# Patient Record
Sex: FEMALE | Race: WHITE | NOT HISPANIC OR LATINO | Employment: UNEMPLOYED | URBAN - METROPOLITAN AREA
[De-identification: names, ages, dates, MRNs, and addresses within clinical notes are randomized per-mention and may not be internally consistent; named-entity substitution may affect disease eponyms.]

---

## 2020-09-03 ENCOUNTER — APPOINTMENT (OUTPATIENT)
Dept: WOMENS IMAGING | Facility: HOSPITAL | Age: 20
End: 2020-09-03

## 2020-09-03 PROCEDURE — 76641 ULTRASOUND BREAST COMPLETE: CPT | Performed by: RADIOLOGY

## 2020-09-18 ENCOUNTER — OFFICE VISIT (OUTPATIENT)
Dept: OBSTETRICS AND GYNECOLOGY | Facility: CLINIC | Age: 20
End: 2020-09-18

## 2020-09-18 VITALS
WEIGHT: 204 LBS | BODY MASS INDEX: 30.92 KG/M2 | SYSTOLIC BLOOD PRESSURE: 120 MMHG | HEIGHT: 68 IN | DIASTOLIC BLOOD PRESSURE: 70 MMHG

## 2020-09-18 DIAGNOSIS — Z01.419 ENCOUNTER FOR ANNUAL ROUTINE GYNECOLOGICAL EXAMINATION: Primary | ICD-10-CM

## 2020-09-18 DIAGNOSIS — Z11.3 SCREENING FOR STD (SEXUALLY TRANSMITTED DISEASE): ICD-10-CM

## 2020-09-18 DIAGNOSIS — Z30.9 ENCOUNTER FOR CONTRACEPTIVE MANAGEMENT, UNSPECIFIED TYPE: ICD-10-CM

## 2020-09-18 PROCEDURE — 99385 PREV VISIT NEW AGE 18-39: CPT | Performed by: NURSE PRACTITIONER

## 2020-09-18 NOTE — PROGRESS NOTES
GYN Annual Exam     Chief Complaint   Patient presents with   • Gynecologic Exam     Annual exam.        HPI    Jose Khan is a 20 y.o. female who presents for annual well woman exam.  She is sexually active. Currently not using anything for contraception. Periods are regular every 28-30 days, lasting 5 days. Dysmenorrhea:mild, occurring first 1-2 days of flow. Cyclic symptoms include none. No intermenstrual bleeding, spotting, or discharge. Performing SBE:  No. Recent mammogram in preparation for breast reduction  This is my first time meeting Jose Khan      OB History    No obstetric history on file.         LMP-2020  Last Pap-has never had  History of abnormal Pap smear: no  History of STD-chlamydia   Family history of uterine, colon or ovarian cancer: no  Family history of breast cancer: no  Mammogram-  History of abnormal mammogram: no  Gardasil Vaccine: unsure    Past Medical History:   Diagnosis Date   • Anemia in pregnancy 2016   •  (spontaneous vaginal delivery) 2016   •  (spontaneous vaginal delivery)        Past Surgical History:   Procedure Laterality Date   • NO PAST SURGERIES         No current outpatient medications on file.    No Known Allergies    Social History     Tobacco Use   • Smoking status: Current Every Day Smoker   Substance Use Topics   • Alcohol use: Not on file   • Drug use: Not on file       Family History   Problem Relation Age of Onset   • No Known Problems Father    • No Known Problems Mother        Review of Systems   Constitutional: Negative for chills, fatigue and fever.   Gastrointestinal: Negative for abdominal distention, abdominal pain, diarrhea, nausea and vomiting.   Genitourinary: Negative for amenorrhea, breast discharge, breast lump, breast pain, dysuria, menstrual problem, pelvic pain, pelvic pressure, vaginal bleeding, vaginal discharge and vaginal pain.   Musculoskeletal: Negative for back pain and gait problem.   Skin: Negative for color  "change and rash.   Neurological: Negative for dizziness and headache.   Psychiatric/Behavioral: Negative for behavioral problems and depressed mood.       /70   Ht 172.7 cm (68\")   Wt 92.5 kg (204 lb)   LMP 09/08/2020   BMI 31.02 kg/m²     Physical Exam  Vitals signs and nursing note reviewed.   Constitutional:       Appearance: Normal appearance.   HENT:      Head: Normocephalic and atraumatic.   Eyes:      Pupils: Pupils are equal, round, and reactive to light.   Neck:      Musculoskeletal: Normal range of motion.   Cardiovascular:      Rate and Rhythm: Normal rate.   Pulmonary:      Effort: Pulmonary effort is normal. No respiratory distress.   Chest:      Breasts: Breasts are symmetrical.         Right: Normal. No swelling, bleeding, inverted nipple, mass, nipple discharge, skin change or tenderness.         Left: Normal. No swelling, bleeding, inverted nipple, mass, nipple discharge, skin change or tenderness.   Abdominal:      General: There is no distension.      Palpations: Abdomen is soft. There is no mass.      Tenderness: There is no abdominal tenderness. There is no guarding.      Hernia: No hernia is present. There is no hernia in the left inguinal area or right inguinal area.   Genitourinary:     Pubic Area: No rash or pubic lice.       Labia:         Right: No rash, tenderness, lesion or injury.         Left: No rash, tenderness, lesion or injury.       Urethra: No prolapse, urethral pain, urethral swelling or urethral lesion.      Vagina: No signs of injury and foreign body. No vaginal discharge, erythema, tenderness, bleeding, lesions or prolapsed vaginal walls.      Cervix: No cervical motion tenderness, discharge, friability, lesion, erythema, cervical bleeding or eversion.      Uterus: Normal. Not deviated, not enlarged and not tender.       Adnexa: Right adnexa normal and left adnexa normal.        Right: No mass, tenderness or fullness.          Left: No mass, tenderness or fullness.  "    Musculoskeletal: Normal range of motion.         General: No swelling.   Lymphadenopathy:      Upper Body:      Right upper body: No supraclavicular, axillary or pectoral adenopathy.      Left upper body: No supraclavicular, axillary or pectoral adenopathy.      Lower Body: No right inguinal adenopathy. No left inguinal adenopathy.   Skin:     General: Skin is warm and dry.      Coloration: Skin is not jaundiced or pale.      Findings: No bruising, erythema, lesion or rash.   Neurological:      General: No focal deficit present.      Mental Status: She is alert and oriented to person, place, and time.      Cranial Nerves: No cranial nerve deficit.   Psychiatric:         Mood and Affect: Mood normal.         Behavior: Behavior normal.         Thought Content: Thought content normal.         Judgment: Judgment normal.            Assessment     1. Annual Gyn Exam  2. Contraception management  3. Screening for STD    Plan   1. Well woman exam: Pap collected Yes. Recommend MVI daily.    2. Contraception: pt declines  3. STD: Enc condoms. Desires STD screen today- Yes. NuSwab declines serum  Smoking status: Jose Khan  reports that she has been smoking. She does not have any smokeless tobacco history on file.. I have educated her on the risk of diseases from using tobacco products such as cancer, COPD and heart diease. I advised her to quit and she is not willing to quit.I spent 3  minutes counseling the patient.   5.  Patient's Body mass index is 31.02 kg/m². BMI is above normal parameters. Recommendations include: exercise counseling and nutrition counseling.  6.   Encouraged annual mammogram screening starting at age 40. Instructed on how to perform SBE. Encouraged breast health self awareness.  7.    Encouraged 150 minutes of exercise per week if not medially contraindicated.     Follow Up one year or PRN    Gertrudis Devlin, APRN  9/18/2020  12:46 EDT

## 2020-09-23 LAB
A VAGINAE DNA VAG QL NAA+PROBE: ABNORMAL SCORE
BVAB2 DNA VAG QL NAA+PROBE: ABNORMAL SCORE
C ALBICANS DNA VAG QL NAA+PROBE: NEGATIVE
C GLABRATA DNA VAG QL NAA+PROBE: NEGATIVE
C TRACH DNA VAG QL NAA+PROBE: POSITIVE
CONV .: NORMAL
CYTOLOGIST CVX/VAG CYTO: NORMAL
CYTOLOGY CVX/VAG DOC CYTO: NORMAL
CYTOLOGY CVX/VAG DOC THIN PREP: NORMAL
DX ICD CODE: NORMAL
HIV 1 & 2 AB SER-IMP: NORMAL
MEGA1 DNA VAG QL NAA+PROBE: ABNORMAL SCORE
N GONORRHOEA DNA VAG QL NAA+PROBE: NEGATIVE
OTHER STN SPEC: NORMAL
STAT OF ADQ CVX/VAG CYTO-IMP: NORMAL
T VAGINALIS DNA VAG QL NAA+PROBE: NEGATIVE

## 2020-09-24 ENCOUNTER — TELEPHONE (OUTPATIENT)
Dept: OBSTETRICS AND GYNECOLOGY | Facility: CLINIC | Age: 20
End: 2020-09-24

## 2020-09-24 RX ORDER — AZITHROMYCIN 500 MG/1
1000 TABLET, FILM COATED ORAL DAILY
Qty: 2 TABLET | Refills: 0 | Status: SHIPPED | OUTPATIENT
Start: 2020-09-24 | End: 2020-09-25

## 2020-09-24 RX ORDER — METRONIDAZOLE 500 MG/1
500 TABLET ORAL 2 TIMES DAILY
Qty: 14 TABLET | Refills: 0 | Status: SHIPPED | OUTPATIENT
Start: 2020-09-24 | End: 2020-10-01

## 2020-09-24 NOTE — TELEPHONE ENCOUNTER
I called Jose and notified vaginal cultures were positive for BV and chlamydia. Advised partner should be tested and treated for chlamydia. Avoid intercourse until 7 days after both have been treated. Recommend f/u in 6-8 weeks for SHIRAZ.     Sending azithromycin and flagyl to pharmacy. Advised not to drink alcohol while taking flagyl.    Gertrudis Devlin, APRN  9/24/2020  12:40

## 2022-03-02 ENCOUNTER — OFFICE VISIT (OUTPATIENT)
Dept: OBSTETRICS AND GYNECOLOGY | Facility: CLINIC | Age: 22
End: 2022-03-02

## 2022-03-02 VITALS
SYSTOLIC BLOOD PRESSURE: 125 MMHG | WEIGHT: 214 LBS | BODY MASS INDEX: 32.43 KG/M2 | DIASTOLIC BLOOD PRESSURE: 71 MMHG | HEIGHT: 68 IN

## 2022-03-02 DIAGNOSIS — Z30.011 ENCOUNTER FOR INITIAL PRESCRIPTION OF CONTRACEPTIVE PILLS: ICD-10-CM

## 2022-03-02 DIAGNOSIS — R10.2 PELVIC PAIN: ICD-10-CM

## 2022-03-02 DIAGNOSIS — N89.8 VAGINAL DISCHARGE: ICD-10-CM

## 2022-03-02 DIAGNOSIS — N89.8 VAGINAL ITCHING: ICD-10-CM

## 2022-03-02 DIAGNOSIS — Z01.419 WOMEN'S ANNUAL ROUTINE GYNECOLOGICAL EXAMINATION: Primary | ICD-10-CM

## 2022-03-02 DIAGNOSIS — Z11.3 SCREENING FOR STD (SEXUALLY TRANSMITTED DISEASE): ICD-10-CM

## 2022-03-02 DIAGNOSIS — N92.6 MISSED MENSES: ICD-10-CM

## 2022-03-02 LAB
B-HCG UR QL: NEGATIVE
BILIRUB BLD-MCNC: NEGATIVE MG/DL
CLARITY, POC: CLEAR
COLOR UR: YELLOW
EXPIRATION DATE: NORMAL
GLUCOSE UR STRIP-MCNC: NEGATIVE MG/DL
INTERNAL NEGATIVE CONTROL: NEGATIVE
INTERNAL POSITIVE CONTROL: POSITIVE
KETONES UR QL: NEGATIVE
LEUKOCYTE EST, POC: NEGATIVE
Lab: NORMAL
NITRITE UR-MCNC: NEGATIVE MG/ML
PH UR: 6.5 [PH] (ref 5–8)
PROT UR STRIP-MCNC: NEGATIVE MG/DL
RBC # UR STRIP: NEGATIVE /UL
SP GR UR: 1.03 (ref 1–1.03)
UROBILINOGEN UR QL: NORMAL

## 2022-03-02 PROCEDURE — 3008F BODY MASS INDEX DOCD: CPT | Performed by: NURSE PRACTITIONER

## 2022-03-02 PROCEDURE — 81025 URINE PREGNANCY TEST: CPT | Performed by: NURSE PRACTITIONER

## 2022-03-02 PROCEDURE — 99213 OFFICE O/P EST LOW 20 MIN: CPT | Performed by: NURSE PRACTITIONER

## 2022-03-02 PROCEDURE — 2014F MENTAL STATUS ASSESS: CPT | Performed by: NURSE PRACTITIONER

## 2022-03-02 PROCEDURE — 99395 PREV VISIT EST AGE 18-39: CPT | Performed by: NURSE PRACTITIONER

## 2022-03-02 RX ORDER — NORGESTIMATE AND ETHINYL ESTRADIOL 0.25-0.035
1 KIT ORAL DAILY
Qty: 28 TABLET | Refills: 12 | Status: SHIPPED | OUTPATIENT
Start: 2022-03-02

## 2022-03-02 RX ORDER — IBUPROFEN 800 MG/1
TABLET ORAL
COMMUNITY
Start: 2022-01-14 | End: 2022-10-12

## 2022-03-02 RX ORDER — FLUCONAZOLE 150 MG/1
150 TABLET ORAL
Qty: 3 TABLET | Refills: 0 | Status: SHIPPED | OUTPATIENT
Start: 2022-03-02 | End: 2022-10-12

## 2022-03-02 NOTE — PROGRESS NOTES
"GYN Annual Exam     Chief Complaint   Patient presents with   • Gynecologic Exam     Annual exam - last ae 2020   • Vaginal Discharge     Pt c/o vaginal discharge with itching and some pelvic pain.        HPI    Jose Khan is a 21 y.o. female who presents for a problem visit. She is due for annual well woman exam and would like to complete this while she is here today.  She is sexually active. Periods are irregular,  lasting 4 days. Dysmenorrhea:none. Cyclic symptoms include none. Performing SBE:occas. She was last seen in our office . Recent  4 months ago, did not keep postpartum follow up, she delivered at [facility]. Denies complications with this pregnancy or delivery. She is not breastfeeding.    C/o vaginal discharge and vaginal itching for one week. Reports discharge is now resolved. She is also having pelvic pain for several days. Pain is located LLQ, worsened by walking. Pain is short in duration and is intermittent. Feels \"like a pinch\" Denies new partners. She is using condoms with IC. She did change soap approx 4 months. She is not using douches.     Missed menses, states periods are very irregular. Reports recent negative home pregnancy test. States she has not had unprotected IC since . She c/o daily nausea for approximately 4 months.     She would like to discuss contraceptive options. She has taken KEVIN in the past. Denies history of migraine with aura, denies history of DVT, there is no family history of DVT. She is an every day smoker.    OB History        3    Para   3    Term                AB        Living   3       SAB        IAB        Ectopic        Molar        Multiple        Live Births   3                LMP- 21  Current contraception: condoms  Last Pap-  negative  History of abnormal Pap smear: no  History of STD-chlamydia  Family history of uterine, colon or ovarian cancer: no  Family history of breast cancer: no  Gardasil Vaccine: " "unsure    Past Medical History:   Diagnosis Date   • Anemia in pregnancy 2016   • Chlamydia    •  (spontaneous vaginal delivery) 2016   •  (spontaneous vaginal delivery) 2017       Past Surgical History:   Procedure Laterality Date   • NO PAST SURGERIES           Current Outpatient Medications:   •  ibuprofen (ADVIL,MOTRIN) 800 MG tablet, TAKE 1 TABLET BY MOUTH EVERY 6 TO 8 HOURS WITH MEALS, Disp: , Rfl:   •  fluconazole (DIFLUCAN) 150 MG tablet, Take 1 tablet by mouth Every 72 (Seventy-Two) Hours., Disp: 3 tablet, Rfl: 0  •  norgestimate-ethinyl estradiol (Sprintec 28) 0.25-35 MG-MCG per tablet, Take 1 tablet by mouth Daily., Disp: 28 tablet, Rfl: 12    No Known Allergies    Social History     Tobacco Use   • Smoking status: Current Every Day Smoker   • Smokeless tobacco: Not on file   Substance Use Topics   • Alcohol use: Not on file   • Drug use: Not on file       Family History   Problem Relation Age of Onset   • No Known Problems Father    • No Known Problems Mother        Review of Systems   Constitutional: Negative for chills, fatigue and fever.   Gastrointestinal: Positive for nausea. Negative for abdominal distention, abdominal pain and vomiting.   Genitourinary: Positive for menstrual problem, pelvic pain and vaginal discharge. Negative for breast discharge, breast lump, breast pain, dysuria, pelvic pressure, vaginal bleeding and vaginal pain.        + vaginal itching   Musculoskeletal: Negative for gait problem.   Skin: Negative for rash.   Neurological: Negative for dizziness and headache.   Psychiatric/Behavioral: Negative for behavioral problems.       /71   Ht 172.7 cm (68\")   Wt 97.1 kg (214 lb)   LMP 2021   BMI 32.54 kg/m²     Physical Exam  Constitutional:       Appearance: Normal appearance.   Genitourinary:      Vulva, bladder and urethral meatus normal.      No lesions in the vagina.      Right Labia: No rash, tenderness, lesions, skin changes or Bartholin's cyst.     Left " Labia: No tenderness, lesions, skin changes, Bartholin's cyst or rash.     No labial fusion noted.      Vulva exam comments: Bilateral labia minora erythematous. No lesions noted.      No inguinal adenopathy present in the right or left side.     No vaginal discharge, erythema, tenderness, bleeding or ulceration.      No vaginal prolapse present.     No vaginal atrophy present.       Right Adnexa: not tender, not full, not palpable, no mass present and not absent.     Left Adnexa: not tender, not full, not palpable, no mass present and not absent.     No cervical motion tenderness, discharge, friability, lesion, polyp, nabothian cyst or eversion.      Uterus is not enlarged, fixed, tender, irregular or prolapsed.      No uterine mass detected.     No urethral tenderness or mass present.      Pelvic exam was performed with patient in the lithotomy position.   Breasts: Breasts are symmetrical.      Right: Present. No swelling, bleeding, inverted nipple, mass, nipple discharge, skin change, tenderness, breast implant, axillary adenopathy or supraclavicular adenopathy.      Left: Present. No swelling, bleeding, inverted nipple, mass, nipple discharge, skin change, tenderness, breast implant, axillary adenopathy or supraclavicular adenopathy.       HENT:      Head: Normocephalic and atraumatic.   Eyes:      Pupils: Pupils are equal, round, and reactive to light.   Cardiovascular:      Rate and Rhythm: Normal rate.   Pulmonary:      Effort: Pulmonary effort is normal.   Abdominal:      General: There is no distension.      Palpations: Abdomen is soft. There is no mass.      Tenderness: There is no abdominal tenderness. There is no guarding.      Hernia: No hernia is present. There is no hernia in the left inguinal area or right inguinal area.   Musculoskeletal:         General: Normal range of motion.      Cervical back: Normal range of motion and neck supple. No tenderness.   Lymphadenopathy:      Cervical: No cervical  adenopathy.      Upper Body:      Right upper body: No supraclavicular, axillary or pectoral adenopathy.      Left upper body: No supraclavicular, axillary or pectoral adenopathy.      Lower Body: No right inguinal adenopathy. No left inguinal adenopathy.   Neurological:      General: No focal deficit present.      Mental Status: She is alert and oriented to person, place, and time.      Cranial Nerves: No cranial nerve deficit.   Skin:     General: Skin is warm and dry.   Psychiatric:         Mood and Affect: Mood normal.         Behavior: Behavior normal.         Thought Content: Thought content normal.         Judgment: Judgment normal.   Vitals and nursing note reviewed.       Brief Urine Lab Results  (Last result in the past 365 days)      Color   Clarity   Blood   Leuk Est   Nitrite   Protein   CREAT   Urine HCG        03/02/22 1053               Negative       03/02/22 1053 Yellow   Clear   Negative   Negative   Negative   Negative               Assessment   Diagnoses and all orders for this visit:    1. Women's annual routine gynecological examination (Primary)    2. Screening for STD (sexually transmitted disease)  -     NuSwab VG+ - Swab, Vagina    3. Encounter for initial prescription of contraceptive pills  -     norgestimate-ethinyl estradiol (Sprintec 28) 0.25-35 MG-MCG per tablet; Take 1 tablet by mouth Daily.  Dispense: 28 tablet; Refill: 12    4. Vaginal discharge  -     NuSwab VG+ - Swab, Vagina    5. Vaginal itching  -     NuSwab VG+ - Swab, Vagina  -     fluconazole (DIFLUCAN) 150 MG tablet; Take 1 tablet by mouth Every 72 (Seventy-Two) Hours.  Dispense: 3 tablet; Refill: 0    6. Pelvic pain  -     NuSwab VG+ - Swab, Vagina  -     Urine Culture - Urine, Urine, Clean Catch  -     POC Urinalysis Dipstick    7. Missed menses  -     POC Pregnancy, Urine       Plan   1. Well woman exam: Pap collected No, up to date. Recommend MVI daily.    2. Contraception: Discussed contraception options at length  including pills, patch, vaginal ring, POPs,  injection, implant, and IUDs.  The risks and benefits of the methods were discussed including but not limited to the increased risk of heart attack, blood clot, and stroke.  It was discussed the contraception does not protect against sexually transmitted infections and condoms are encouraged. The patient desires to start KEVIN. Discussed start up, uses and side effects. Encouraged condoms with IC for first 3 weeks to prevent pregnancy. Discussed risks of tobacco use in combination with KEVIN  3. STD: Enc condoms. STD screen today- Yes. NuSwab  4. Smoking status: current every day smoker  5.  Encouraged annual mammogram screening starting at age 40. Instructed on how to perform SBE. Encouraged breast health self awareness.  6.    Encouraged 150 minutes of exercise per week if not medially contraindicated.   7.    Patient's Body mass index is 32.54 kg/m². indicating that she is obese by BMI (BMI >30).   8.    Bilateral labia minora erythema suspicious of yeast, will treat empirically with difflucan. No vaginal discharge noted on exam.  9.    Pelvic pain: hx chlamydia X2, vaginal cultures and urine culture obtained. If both are negative will consider TVUS in 2 weeks   10.  Urine hcg negative in office today      Follow Up in 2 weeks if no improvement in pelvic pain, one year, or PRN    TOMAS Raza  3/2/2022  11:00 EST

## 2022-03-04 ENCOUNTER — TELEPHONE (OUTPATIENT)
Dept: OBSTETRICS AND GYNECOLOGY | Facility: CLINIC | Age: 22
End: 2022-03-04

## 2022-03-04 LAB
A VAGINAE DNA VAG QL NAA+PROBE: NORMAL SCORE
BACTERIA UR CULT: NORMAL
BACTERIA UR CULT: NORMAL
BVAB2 DNA VAG QL NAA+PROBE: NORMAL SCORE
C ALBICANS DNA VAG QL NAA+PROBE: NEGATIVE
C GLABRATA DNA VAG QL NAA+PROBE: NEGATIVE
C TRACH DNA VAG QL NAA+PROBE: NEGATIVE
MEGA1 DNA VAG QL NAA+PROBE: NORMAL SCORE
N GONORRHOEA DNA VAG QL NAA+PROBE: NEGATIVE
T VAGINALIS DNA VAG QL NAA+PROBE: NEGATIVE

## 2022-03-04 NOTE — TELEPHONE ENCOUNTER
Spoke with Jose. Told her Gertrudis Claus, TOMAS wanted me to call her and let her know that her vaginal cultures were normal and that her urine culture was negative for UTI. Jose said that Gertrudis told her if all negative would get an US to check for cysts. I scheduled her for US and F/U with Gertrudis

## 2022-03-04 NOTE — TELEPHONE ENCOUNTER
----- Message from TOMAS Raza sent at 3/4/2022 10:30 AM EST -----  Please let the pt know that her vaginal cultures were normal. Urine culture was negative for UTI. Thank you

## 2022-03-04 NOTE — PROGRESS NOTES
When I called Jose, she told me that if everything was negative, you wanted her to have an US to check for cysts. I scheduled her for 3/16 US and F/U with you at [place], 2 and 2:30 pm. Is that ok? I wasn't sure if you needed to see her after the US.

## 2022-03-04 NOTE — PROGRESS NOTES
Please let the pt know that her vaginal cultures were normal. Urine culture was negative for UTI. Thank you

## 2022-03-16 ENCOUNTER — OFFICE VISIT (OUTPATIENT)
Dept: OBSTETRICS AND GYNECOLOGY | Facility: CLINIC | Age: 22
End: 2022-03-16

## 2022-03-16 VITALS
DIASTOLIC BLOOD PRESSURE: 72 MMHG | WEIGHT: 211 LBS | SYSTOLIC BLOOD PRESSURE: 121 MMHG | BODY MASS INDEX: 31.98 KG/M2 | HEIGHT: 68 IN

## 2022-03-16 DIAGNOSIS — R10.2 PELVIC PAIN: Primary | ICD-10-CM

## 2022-03-16 DIAGNOSIS — N94.10 FEMALE DYSPAREUNIA: ICD-10-CM

## 2022-03-16 PROCEDURE — 99213 OFFICE O/P EST LOW 20 MIN: CPT | Performed by: NURSE PRACTITIONER

## 2022-03-16 RX ORDER — ESTRADIOL 0.1 MG/G
1 CREAM VAGINAL DAILY
Qty: 42.5 G | Refills: 0 | Status: SHIPPED | OUTPATIENT
Start: 2022-03-16 | End: 2022-03-26

## 2022-03-16 NOTE — PROGRESS NOTES
"Chief Complaint   Patient presents with   • Follow-up     US to evaluate pelvic pain        SUBJECTIVE:     Jose Khan is a 21 y.o.  who presents to f/u pelvic pain. She has had no improvement in her pain since last visit. Reports pinching sensation is now located on both right and left lower quadrants. Denies constipation or diarrhea. Denies heavy lifting or changes in activity. She works at [employer], but states she is not lifting any equipment at work.  Pain is short in duration and is intermittent. LMP 3/14/22. C/o painful IC since recent delivery. Will also have sharp, stabbing vaginal pain intermittently when walking.      Past Medical History:   Diagnosis Date   • Anemia in pregnancy 2016   • Chlamydia    •  (spontaneous vaginal delivery)    •  (spontaneous vaginal delivery) 2017      Past Surgical History:   Procedure Laterality Date   • NO PAST SURGERIES        Social History     Tobacco Use   • Smoking status: Current Every Day Smoker     OB History    Para Term  AB Living   3 3       3   SAB IAB Ectopic Molar Multiple Live Births             3      # Outcome Date GA Lbr Yogesh/2nd Weight Sex Delivery Anes PTL Lv   3 Para      Vag-Spont   ROLANDA   2 Para      Vag-Spont   ROLANDA      Birth Comments: [facility]   1 Para 2016     Vag-Spont   ROLANDA        Review of Systems   Constitutional: Negative for chills, fatigue and fever.   Gastrointestinal: Negative for constipation and diarrhea.   Genitourinary: Positive for dyspareunia, pelvic pain and vaginal pain. Negative for dysuria, frequency, vaginal bleeding and vaginal discharge.       OBJECTIVE:   Vitals:    22 1434   BP: 121/72   Weight: 95.7 kg (211 lb)   Height: 172.7 cm (68\")        Physical Exam  Constitutional:       Appearance: Normal appearance.   Cardiovascular:      Rate and Rhythm: Normal rate.      Pulses: Normal pulses.   Pulmonary:      Effort: Pulmonary effort is normal.   Abdominal:      General: There is no distension.    "   Palpations: Abdomen is soft. There is no mass.      Tenderness: There is no abdominal tenderness. There is no guarding.      Hernia: No hernia is present.   Musculoskeletal:         General: Normal range of motion.      Cervical back: Normal range of motion.   Neurological:      General: No focal deficit present.      Mental Status: She is alert and oriented to person, place, and time.   Skin:     General: Skin is warm and dry.   Vitals and nursing note reviewed.         Assessment/Plan    Diagnoses and all orders for this visit:    1. Pelvic pain (Primary)    2. Female dyspareunia  -     estradiol (ESTRACE VAGINAL) 0.1 MG/GM vaginal cream; Insert 1 g into the vagina Daily for 10 days.  Dispense: 42.5 g; Refill: 0    TVUS completed today, reviewed results with pt. Normal uterus with no obvious endometrial masses, Normal appearing ovaries  Free fluid in posterior cul de sac measuring approximately 4.9cm  Reviewed negative urine and vaginal culture results  Enc lubricants with IC, will trial a short course of vaginal estrace. She will use once a day X7 days then twice weekly for one week  Discussed use of vaginal dilators   Discussed possible causes of pelvic pain, including, but not limited to GI in nature, musculoskeletal in nature  Discussed pelvic floor PT if no improvement in pelvic pain.   Discussed referral to GI if no improvement or with any worsening symptoms.     Follow up: PRN and annually     I spent 20 minutes caring for Jose on this date of service. This time includes time spent by me in the following activities: preparing for the visit, reviewing tests, obtaining and/or reviewing a separately obtained history, performing a medically appropriate examination and/or evaluation, counseling and educating the patient/family/caregiver, ordering medications, tests, or procedures and documenting information in the medical record    Gertrudis Devlin, APRN  3/16/2022  19:35 EDT

## 2022-10-06 ENCOUNTER — CLINICAL SUPPORT (OUTPATIENT)
Dept: OBSTETRICS AND GYNECOLOGY | Facility: CLINIC | Age: 22
End: 2022-10-06

## 2022-10-06 DIAGNOSIS — R35.0 URINARY FREQUENCY: Primary | ICD-10-CM

## 2022-10-08 LAB
BACTERIA UR CULT: NORMAL
BACTERIA UR CULT: NORMAL

## 2022-10-12 ENCOUNTER — OFFICE VISIT (OUTPATIENT)
Dept: OBSTETRICS AND GYNECOLOGY | Facility: CLINIC | Age: 22
End: 2022-10-12

## 2022-10-12 VITALS — BODY MASS INDEX: 34.36 KG/M2 | SYSTOLIC BLOOD PRESSURE: 118 MMHG | DIASTOLIC BLOOD PRESSURE: 69 MMHG | WEIGHT: 226 LBS

## 2022-10-12 DIAGNOSIS — N94.9 VAGINAL BURNING: ICD-10-CM

## 2022-10-12 DIAGNOSIS — N94.10 DYSPAREUNIA, FEMALE: ICD-10-CM

## 2022-10-12 DIAGNOSIS — R10.2 PELVIC PAIN: ICD-10-CM

## 2022-10-12 DIAGNOSIS — N89.8 VAGINAL DISCHARGE: Primary | ICD-10-CM

## 2022-10-12 LAB
BILIRUB BLD-MCNC: NEGATIVE MG/DL
CLARITY, POC: CLEAR
COLOR UR: YELLOW
GLUCOSE UR STRIP-MCNC: NEGATIVE MG/DL
KETONES UR QL: NEGATIVE
LEUKOCYTE EST, POC: NEGATIVE
NITRITE UR-MCNC: NEGATIVE MG/ML
PH UR: 6.5 [PH] (ref 5–8)
PROT UR STRIP-MCNC: NEGATIVE MG/DL
RBC # UR STRIP: NEGATIVE /UL
SP GR UR: 1.03 (ref 1–1.03)
UROBILINOGEN UR QL: NORMAL

## 2022-10-12 PROCEDURE — 99214 OFFICE O/P EST MOD 30 MIN: CPT | Performed by: NURSE PRACTITIONER

## 2022-10-12 NOTE — PROGRESS NOTES
Chief Complaint   Patient presents with   • Vaginal Discharge     Pt c/o vaginal discharge with vaginal burning.         SUBJECTIVE:     Jose Khan is a 22 y.o.  who presents with c/o vaginal discharge and vaginal burning for approx 7 months. These symptoms occur intermittently. She has once seen a green colored discharge. She has also noticed blood in discharge. Denies current dysuria, recent negative urine culture with our office. C/o pelvic pain, she has had a negative work up for this this year, pelvic pain has been intermittent for over 6 months. Hx dyspareunia, she has been treated with vaginal estrogen with no improvement. We have discussed use of vaginal dilators. This is a new problem. LMP 2022. Denies new partners or changes in soaps.Hx chlamydia. Denies concerns for STD.     Past Medical History:   Diagnosis Date   • Anemia in pregnancy    • Chlamydia    •  (spontaneous vaginal delivery)    •  (spontaneous vaginal delivery)       Past Surgical History:   Procedure Laterality Date   • NO PAST SURGERIES        Social History     Tobacco Use   • Smoking status: Every Day     OB History    Para Term  AB Living   3 3       3   SAB IAB Ectopic Molar Multiple Live Births             3      # Outcome Date GA Lbr Yogesh/2nd Weight Sex Delivery Anes PTL Lv   3 Para      Vag-Spont   ROLANDA   2 Para 2017     Vag-Spont   ROLANDA      Birth Comments: [facility]   1 Para      Vag-Spont   ROLANDA        Review of Systems   Constitutional: Negative for chills, fatigue and fever.   Gastrointestinal: Negative for abdominal distention, abdominal pain, diarrhea, nausea and vomiting.   Genitourinary: Positive for dyspareunia (intermittent ), frequency, pelvic pain, urgency, vaginal discharge and vaginal pain (burning). Negative for dysuria and vaginal bleeding.        - vaginal itching  - vaginal odor   Musculoskeletal: Positive for back pain (not a new problem). Negative for gait problem.        OBJECTIVE:   Vitals:    10/12/22 1250   BP: 118/69   Weight: 103 kg (226 lb)        Physical Exam  Constitutional:       General: She is not in acute distress.     Appearance: Normal appearance. She is not ill-appearing, toxic-appearing or diaphoretic.   Genitourinary:      Bladder and urethral meatus normal.      No lesions in the vagina.      Right Labia: No rash, tenderness, lesions, skin changes or Bartholin's cyst.     Left Labia: No tenderness, lesions, skin changes, Bartholin's cyst or rash.     No labial fusion noted.      No inguinal adenopathy present in the right or left side.     Vaginal discharge (scant, white) present.      No vaginal erythema, tenderness, bleeding, ulceration or granulation tissue.      No vaginal prolapse present.     No vaginal atrophy present.       Right Adnexa: not tender, not full, not palpable, no mass present and not absent.     Left Adnexa: not tender, not full, not palpable, no mass present and not absent.     No cervical motion tenderness, discharge, friability, lesion, polyp, nabothian cyst or eversion.      Uterus is not enlarged, fixed, tender, irregular or prolapsed.      No uterine mass detected.  Cardiovascular:      Rate and Rhythm: Normal rate.   Pulmonary:      Effort: Pulmonary effort is normal.   Abdominal:      General: There is no distension.      Palpations: Abdomen is soft. There is no mass.      Tenderness: There is no abdominal tenderness. There is no guarding.      Hernia: No hernia is present. There is no hernia in the left inguinal area or right inguinal area.   Musculoskeletal:         General: Normal range of motion.      Cervical back: Normal range of motion.   Lymphadenopathy:      Lower Body: No right inguinal adenopathy. No left inguinal adenopathy.   Neurological:      General: No focal deficit present.      Mental Status: She is alert and oriented to person, place, and time.      Cranial Nerves: No cranial nerve deficit.   Skin:      General: Skin is warm and dry.   Psychiatric:         Mood and Affect: Mood normal.         Behavior: Behavior normal.         Thought Content: Thought content normal.         Judgment: Judgment normal.   Vitals and nursing note reviewed.       Assessment/Plan    Diagnoses and all orders for this visit:    1. Vaginal discharge (Primary)  -     NuSwab VG+ - Swab, Vagina  -     Mycoplasma / Ureaplasma Culture - Swab, Cervix    2. Vaginal burning  -     POC Urinalysis Dipstick  -     NuSwab VG+ - Swab, Vagina  -     Mycoplasma / Ureaplasma Culture - Swab, Cervix    3. Pelvic pain    4. Dyspareunia, female    Discussed possible causes of intermittent vaginal discharge, discussed normal vaginal discharge vs abnormal vaginal discharge.   Will check NuSwab, ureaplasma and mycoplasma. We discussed ureaplasma and mycoplasma in detail  Scant white discharge noted. Will await culture results and treat if indicated  Discussed on going pelvic pain and pain with IC. Offered f/u with MD in our office to test for endometriosis, offered referral to PT.  She would like to consider her options.     Follow up: PRN and annually     I spent 30 minutes caring for Jose on this date of service. This time includes time spent by me in the following activities: preparing for the visit, reviewing tests, obtaining and/or reviewing a separately obtained history, performing a medically appropriate examination and/or evaluation, counseling and educating the patient/family/caregiver, ordering medications, tests, or procedures, referring and communicating with other health care professionals and documenting information in the medical record      Gertrudis Devlin, APRN  10/12/2022  13:30 EDT

## 2022-10-14 LAB
A VAGINAE DNA VAG QL NAA+PROBE: NORMAL SCORE
BVAB2 DNA VAG QL NAA+PROBE: NORMAL SCORE
C ALBICANS DNA VAG QL NAA+PROBE: NEGATIVE
C GLABRATA DNA VAG QL NAA+PROBE: NEGATIVE
C TRACH DNA VAG QL NAA+PROBE: NEGATIVE
MEGA1 DNA VAG QL NAA+PROBE: NORMAL SCORE
N GONORRHOEA DNA VAG QL NAA+PROBE: NEGATIVE
T VAGINALIS DNA VAG QL NAA+PROBE: NEGATIVE

## 2022-10-18 LAB
M HOMINIS SPEC QL CULT: NEGATIVE
U UREALYTICUM SPEC QL CULT: NEGATIVE

## 2022-12-14 ENCOUNTER — CLINICAL SUPPORT (OUTPATIENT)
Dept: OBSTETRICS AND GYNECOLOGY | Facility: CLINIC | Age: 22
End: 2022-12-14

## 2022-12-14 ENCOUNTER — TELEPHONE (OUTPATIENT)
Dept: OBSTETRICS AND GYNECOLOGY | Facility: CLINIC | Age: 22
End: 2022-12-14

## 2022-12-14 DIAGNOSIS — N94.9 VAGINAL BURNING: Primary | ICD-10-CM

## 2022-12-14 LAB
BILIRUB BLD-MCNC: NEGATIVE MG/DL
GLUCOSE UR STRIP-MCNC: NEGATIVE MG/DL
KETONES UR QL: NEGATIVE
LEUKOCYTE EST, POC: NEGATIVE
NITRITE UR-MCNC: NEGATIVE MG/ML
PH UR: 7 [PH] (ref 5–8)
PROT UR STRIP-MCNC: NEGATIVE MG/DL
RBC # UR STRIP: NEGATIVE /UL
SP GR UR: 1.03 (ref 1–1.03)
UROBILINOGEN UR QL: NORMAL

## 2022-12-14 PROCEDURE — 81002 URINALYSIS NONAUTO W/O SCOPE: CPT | Performed by: NURSE PRACTITIONER

## 2022-12-14 NOTE — TELEPHONE ENCOUNTER
Pt left urine sample at office. UA was reviewed by Gertrudis, results were normal. Per verbal from Gertrudis, patient can make an appt for f/u in the office. I called pt and informed her of normal UA results and advised regarding appt. Pt will call back if appt is needed.

## 2022-12-14 NOTE — TELEPHONE ENCOUNTER
Pt think she has a uti, having some itching and burning. Can she come in to leave a urine culture or does she need to make an appt?

## 2022-12-14 NOTE — TELEPHONE ENCOUNTER
She may come to the office to leave a urine sample if she would like or I am glad to see her for f/u if needed.

## 2022-12-16 LAB
BACTERIA UR CULT: NORMAL
BACTERIA UR CULT: NORMAL

## 2024-12-18 ENCOUNTER — TELEPHONE (OUTPATIENT)
Dept: OBSTETRICS AND GYNECOLOGY | Facility: CLINIC | Age: 24
End: 2024-12-18
Payer: COMMERCIAL

## 2025-01-30 ENCOUNTER — TELEPHONE (OUTPATIENT)
Dept: OBSTETRICS AND GYNECOLOGY | Facility: CLINIC | Age: 25
End: 2025-01-30
Payer: COMMERCIAL

## 2025-02-21 ENCOUNTER — TELEPHONE (OUTPATIENT)
Dept: OBSTETRICS AND GYNECOLOGY | Facility: CLINIC | Age: 25
End: 2025-02-21
Payer: COMMERCIAL

## 2025-02-21 NOTE — TELEPHONE ENCOUNTER
Called pt about n/s on 2/19/25, pt marked in her calendar as dentist and went to wrong doctor. Pt r/s'd.alejandra

## 2025-03-12 ENCOUNTER — OFFICE VISIT (OUTPATIENT)
Dept: OBSTETRICS AND GYNECOLOGY | Facility: CLINIC | Age: 25
End: 2025-03-12
Payer: COMMERCIAL

## 2025-03-12 VITALS
BODY MASS INDEX: 25.31 KG/M2 | DIASTOLIC BLOOD PRESSURE: 72 MMHG | HEIGHT: 68 IN | WEIGHT: 167 LBS | SYSTOLIC BLOOD PRESSURE: 120 MMHG

## 2025-03-12 DIAGNOSIS — Z01.419 WOMEN'S ANNUAL ROUTINE GYNECOLOGICAL EXAMINATION: Primary | ICD-10-CM

## 2025-03-12 DIAGNOSIS — R35.0 URINARY FREQUENCY: ICD-10-CM

## 2025-03-12 DIAGNOSIS — N89.8 VAGINAL DISCHARGE: ICD-10-CM

## 2025-03-12 DIAGNOSIS — Z11.3 SCREENING FOR STD (SEXUALLY TRANSMITTED DISEASE): ICD-10-CM

## 2025-03-12 LAB
BILIRUB BLD-MCNC: NEGATIVE MG/DL
GLUCOSE UR STRIP-MCNC: NEGATIVE MG/DL
KETONES UR QL: NEGATIVE
LEUKOCYTE EST, POC: ABNORMAL
NITRITE UR-MCNC: NEGATIVE MG/ML
PH UR: 7 [PH] (ref 5–8)
PROT UR STRIP-MCNC: NEGATIVE MG/DL
RBC # UR STRIP: NEGATIVE /UL
SP GR UR: 1.01 (ref 1–1.03)
UROBILINOGEN UR QL: ABNORMAL

## 2025-03-12 NOTE — PROGRESS NOTES
GYN Annual Exam     Chief Complaint   Patient presents with    Gynecologic Exam     Pt here today for AE, would like STD testing and having urinary frequency      HPI    Jose Khan is a 24 y.o. female who presents for annual well woman exam with a problem.  She is sexually active. Periods are regular every 28-30 days, lasting 5 days. Dysmenorrhea:mild, occurring first 1-2 days of flow. No intermenstrual bleeding, spotting, or discharge. Performing SBE:occas. Of note, pt is also followed by [facility].     C/o urinary frequency for 2 weeks. This is intermittent. She is drinking 3 energy drinks per day as well as tea and water during the day. Occasionally drinking caffeine. She has read this could also be a sign of a yeast infection. Denies dysuria.   OB History          3    Para   3    Term                AB        Living   3         SAB        IAB        Ectopic        Molar        Multiple        Live Births   3              LMP- 24  Current contraception: Bilateral salpingectomy 23 with [facility]  Last Pap-  NIL  History of abnormal Pap smear: no  History of STD-chlamydia  Family history of uterine, colon or ovarian cancer: no  Family history of breast cancer: no  Gardasil Vaccine: unsure    Past Medical History:   Diagnosis Date    Anemia in pregnancy 2016    Anxiety     Chlamydia     Depression     Multiple gestation 16     (spontaneous vaginal delivery) 2016     (spontaneous vaginal delivery)        Past Surgical History:   Procedure Laterality Date    TUBAL ABDOMINAL LIGATION         No current outpatient medications on file.    No Known Allergies    Social History     Tobacco Use    Smoking status: Every Day   Vaping Use    Vaping status: Every Day       Family History   Problem Relation Age of Onset    No Known Problems Father     No Known Problems Mother        Review of Systems   Constitutional:  Negative for chills, fatigue and fever.   Gastrointestinal:   "Negative for abdominal distention, abdominal pain, nausea and vomiting.   Genitourinary:  Positive for frequency. Negative for breast discharge, breast lump, breast pain, dysuria, menstrual problem, pelvic pain, pelvic pressure, urgency, vaginal bleeding, vaginal discharge and vaginal pain.   Musculoskeletal:  Negative for gait problem.   Skin:  Negative for rash.   Neurological:  Negative for dizziness and headache.   Psychiatric/Behavioral:  Negative for behavioral problems.        /72   Ht 172.7 cm (68\")   Wt 75.8 kg (167 lb)   LMP 02/14/2025   BMI 25.39 kg/m²     Physical Exam  Constitutional:       General: She is not in acute distress.     Appearance: Normal appearance. She is not ill-appearing, toxic-appearing or diaphoretic.   Genitourinary:      Vulva, bladder and urethral meatus normal.      No lesions in the vagina.      Right Labia: No rash, tenderness, lesions, skin changes or Bartholin's cyst.     Left Labia: No tenderness, lesions, skin changes, Bartholin's cyst or rash.     No labial fusion noted.      No inguinal adenopathy present in the right or left side.     Vaginal discharge (small amount thick white discharge noted) present.      No vaginal erythema, tenderness, bleeding or ulceration.      No vaginal prolapse present.     No vaginal atrophy present.       Right Adnexa: not tender, not full, not palpable, no mass present and not absent.     Left Adnexa: not tender, not full, not palpable, no mass present and not absent.     Cervical friability present.      No cervical motion tenderness, discharge, lesion, polyp, nabothian cyst or eversion.      Uterus is not enlarged, fixed, tender, irregular or prolapsed.      No uterine mass detected.     No urethral tenderness or mass present.      Pelvic exam was performed with patient in the lithotomy position.   Breasts:     Breasts are symmetrical.      Right: Present. No swelling, bleeding, inverted nipple, mass, nipple discharge, skin " change, tenderness or breast implant.      Left: Present. No swelling, bleeding, inverted nipple, mass, nipple discharge, skin change, tenderness or breast implant.   HENT:      Head: Normocephalic and atraumatic.   Eyes:      Pupils: Pupils are equal, round, and reactive to light.   Cardiovascular:      Rate and Rhythm: Normal rate.   Pulmonary:      Effort: Pulmonary effort is normal.   Abdominal:      General: There is no distension.      Palpations: Abdomen is soft. There is no mass.      Tenderness: There is no abdominal tenderness. There is no guarding.      Hernia: No hernia is present. There is no hernia in the left inguinal area or right inguinal area.   Musculoskeletal:         General: Normal range of motion.      Cervical back: Normal range of motion and neck supple. No tenderness.   Lymphadenopathy:      Cervical: No cervical adenopathy.      Upper Body:      Right upper body: No supraclavicular, axillary or pectoral adenopathy.      Left upper body: No supraclavicular, axillary or pectoral adenopathy.      Lower Body: No right inguinal adenopathy. No left inguinal adenopathy.   Neurological:      General: No focal deficit present.      Mental Status: She is alert and oriented to person, place, and time.      Cranial Nerves: No cranial nerve deficit.   Skin:     General: Skin is warm and dry.   Psychiatric:         Mood and Affect: Mood normal.         Behavior: Behavior normal.         Thought Content: Thought content normal.         Judgment: Judgment normal.   Vitals and nursing note reviewed.         Assessment   Diagnoses and all orders for this visit:    1. Women's annual routine gynecological examination (Primary)  -     IGP,CtNgTv,rfx Apt HPV All    2. Screening for STD (sexually transmitted disease)  -     IGP,CtNgTv,rfx Apt HPV All  -     HIV-1 / O / 2 Ag / Antibody  -     RPR, Rfx Qn RPR / Confirm TP  -     Hepatitis C Antibody  -     Hepatitis B Surface Antigen    3. Urinary frequency        Plan   Well woman exam: Pap smear collected. Recommend MVI daily.    Contraception: Bilateral salpingectomy 1/22/24 with [facility]  STD: Enc condoms. Desires STD screen today- Yes. Serum and Pap   Smoking status: every day smoker   Encouraged annual mammogram screening starting at age 40. Instructed on how to perform SBE. Encouraged breast health self awareness.  6.    Encouraged 150 minutes of exercise per week if not medially contraindicated.   7.    BMI 25.39  8.    Gardasil vaccine: We discussed that this is a vaccine to protect against the human papilloma virus. HPV can cause cervical cancer, as well as genital warts. The vaccine reduces the chance of cervical cancer by up to 70 percent. It also can protect against cancers of the vulva, vagina, anus and possibly laryngeal cancers. The vaccine is recommended for girls and boys the recommended age is 11-12, with catch up vaccination for anyone over that age until the age of 27. There are 3 vaccines in the series.   9. Vaginal discharge: small amount thick white discharge noted. Cultures obtained. Will await results and treat if indicated  10. Urinary frequency: Urine dip negative today in office. Dsicussed caffeine can lead to urinary frequency. Recommend limited caffeine intake. Increase intake of water. Call with any worsening symptoms     Return in about 1 year (around 3/12/2026) for Annual physical, TOMAS Joshi.    TOMAS Raza  3/12/2025  10:29 EDT

## 2025-03-13 LAB
HBV SURFACE AG SERPL QL IA: NEGATIVE
HCV IGG SERPL QL IA: NON REACTIVE
HIV 1+2 AB+HIV1 P24 AG SERPL QL IA: NON REACTIVE
RPR SER QL: NON REACTIVE

## 2025-03-14 LAB
A VAGINAE DNA VAG QL NAA+PROBE: ABNORMAL SCORE
BVAB2 DNA VAG QL NAA+PROBE: ABNORMAL SCORE
C ALBICANS DNA VAG QL NAA+PROBE: NEGATIVE
C GLABRATA DNA VAG QL NAA+PROBE: NEGATIVE
MEGA1 DNA VAG QL NAA+PROBE: ABNORMAL SCORE

## 2025-03-14 RX ORDER — METRONIDAZOLE 65 MG/5G
1 GEL TOPICAL NIGHTLY
Qty: 5 G | Refills: 0 | Status: SHIPPED | OUTPATIENT
Start: 2025-03-14 | End: 2025-03-15

## 2025-03-20 LAB
C TRACH RRNA CVX QL NAA+PROBE: NEGATIVE
CONV .: ABNORMAL
CYTOLOGIST CVX/VAG CYTO: ABNORMAL
CYTOLOGY CVX/VAG DOC CYTO: ABNORMAL
CYTOLOGY CVX/VAG DOC THIN PREP: ABNORMAL
DX ICD CODE: ABNORMAL
DX ICD CODE: ABNORMAL
HPV I/H RISK 4 DNA CVX QL PROBE+SIG AMP: POSITIVE
N GONORRHOEA RRNA CVX QL NAA+PROBE: NEGATIVE
OTHER STN SPEC: ABNORMAL
PATHOLOGIST CVX/VAG CYTO: ABNORMAL
SERVICE CMNT-IMP: ABNORMAL
STAT OF ADQ CVX/VAG CYTO-IMP: ABNORMAL
T VAGINALIS RRNA SPEC QL NAA+PROBE: NEGATIVE

## 2025-03-24 ENCOUNTER — TELEPHONE (OUTPATIENT)
Dept: OBSTETRICS AND GYNECOLOGY | Facility: CLINIC | Age: 25
End: 2025-03-24
Payer: COMMERCIAL

## 2025-03-24 NOTE — TELEPHONE ENCOUNTER
I called Jose Khan to review abnormal pap smear results. No answer, left VM to return my call.    Pap smear returned with LSIL, +HPV. Pt needs colposcopy to further evaluate this result.    Gertrudis Devlin, APRN  3/24/25  9:18am

## 2025-03-25 NOTE — TELEPHONE ENCOUNTER
Hub staff attempted to follow warm transfer process and was unsuccessful     Caller: Jose Khan    Relationship to patient: Self    Best call back number: [phone] / LVM    Patient is needing: PT CALLED BACK TO GET TEST RESULTS - PLEASE CALL THE PT BACK     THANK YOU!

## 2025-03-26 ENCOUNTER — TELEPHONE (OUTPATIENT)
Dept: OBSTETRICS AND GYNECOLOGY | Facility: CLINIC | Age: 25
End: 2025-03-26
Payer: COMMERCIAL

## 2025-03-26 NOTE — TELEPHONE ENCOUNTER
I called Jose Khan to review pap smear results. Notified LSIL, +HPV. Recommend colposcopy to further evaluate. Discussed this procedure in detail.     Gertrudis Devlin, APRN   3/26/25  8:43 am

## 2025-04-09 ENCOUNTER — OFFICE VISIT (OUTPATIENT)
Dept: OBSTETRICS AND GYNECOLOGY | Facility: CLINIC | Age: 25
End: 2025-04-09
Payer: COMMERCIAL

## 2025-04-09 VITALS
HEIGHT: 68 IN | WEIGHT: 167 LBS | BODY MASS INDEX: 25.31 KG/M2 | DIASTOLIC BLOOD PRESSURE: 73 MMHG | SYSTOLIC BLOOD PRESSURE: 118 MMHG

## 2025-04-09 DIAGNOSIS — Z32.00 ENCOUNTER FOR PREGNANCY TEST, RESULT UNKNOWN: ICD-10-CM

## 2025-04-09 DIAGNOSIS — N91.2 AMENORRHEA: ICD-10-CM

## 2025-04-09 DIAGNOSIS — R87.612 LOW GRADE SQUAMOUS INTRAEPITH LESION ON CYTOLOGIC SMEAR CERVIX (LGSIL): Primary | ICD-10-CM

## 2025-04-09 DIAGNOSIS — B97.7 HPV IN FEMALE: ICD-10-CM

## 2025-04-09 LAB
B-HCG UR QL: NEGATIVE
EXPIRATION DATE: NORMAL
INTERNAL NEGATIVE CONTROL: NEGATIVE
INTERNAL POSITIVE CONTROL: POSITIVE
Lab: NORMAL

## 2025-04-09 NOTE — PROGRESS NOTES
"Chief Complaint   Patient presents with    Colposcopy        SUBJECTIVE:     Jose Khan is a 24 y.o.  who presents for colposcopy for LSIL, +HPV. We have discussed HPV vaccination in the past and she is unsure if she has completed. She c/o no period since February. This is a new problem. LMP 24. She had Bilateral salpingectomy 23 with [facility]. Denies new medications. Reports some weight fluctuations.     Past Medical History:   Diagnosis Date    Anemia in pregnancy     Anxiety     Chlamydia     Depression     Multiple gestation 15     (spontaneous vaginal delivery)      (spontaneous vaginal delivery)       Past Surgical History:   Procedure Laterality Date    TUBAL ABDOMINAL LIGATION        Review of Systems   Constitutional:  Negative for chills, fatigue and fever.   Gastrointestinal:  Negative for abdominal distention and abdominal pain.   Genitourinary:  Positive for menstrual problem. Negative for pelvic pain, vaginal bleeding, vaginal discharge and vaginal pain.        + amenorrhea      OBJECTIVE:   Vitals:    25 1106   BP: 118/73   Weight: 75.8 kg (167 lb)   Height: 172.7 cm (68\")      Colposcopy Procedure Note    Indications:   Pap smear: LSIL, HPV positive  She has not had previous abnormal pap smears. She has not had a conization or LEEP procedure in the past.   Prior cervical procedures/treatments N/A    History:  Social History     Tobacco Use   Smoking Status Every Day   Smokeless Tobacco Not on file     Contraception: Bilateral salpingectomy    Procedure Details   The risks and benefits of the procedure and Verbal informed consent obtained.  Urine pregnancy test was done and was negative    Speculum placed in vagina and excellent visualization of cervix achieved, cervix swabbed x 3 with acetic acid solution.  360 degree visualization of the squamocolumnar junction was noted   Findings:  Cervix: SCJ visualized 360 degrees without lesions, cervical biopsies taken " at 9 o'clock, endometrial biopsy performed, and specimen labelled and sent to pathology.  no mosaicism, no punctation, no abnormal vasculature, and acetowhite lesion(s) noted at 9 o'clock; Hemostasis of biopsy site achieved with silver nitrate and pressure  Vaginal inspection: vaginal colposcopy not performed.   Vulvar colposcopy: vulvar colposcopy not performed.  Physical Exam  Constitutional:       General: She is not in acute distress.     Appearance: Normal appearance. She is not ill-appearing, toxic-appearing or diaphoretic.   Genitourinary:      Bladder and urethral meatus normal.      No lesions in the vagina.      Right Labia: No rash, tenderness, lesions, skin changes or Bartholin's cyst.     Left Labia: No tenderness, lesions, skin changes, Bartholin's cyst or rash.     No labial fusion noted.      No inguinal adenopathy present in the right or left side.     No vaginal discharge, erythema, tenderness, bleeding, ulceration or granulation tissue.      No vaginal prolapse present.     No vaginal atrophy present.     Cervical lesion present.      No cervical motion tenderness, discharge, friability, polyp, nabothian cyst or eversion.   Cardiovascular:      Rate and Rhythm: Normal rate.   Pulmonary:      Effort: Pulmonary effort is normal.   Abdominal:      General: There is no distension.      Palpations: Abdomen is soft. There is no mass.      Tenderness: There is no abdominal tenderness. There is no guarding.      Hernia: No hernia is present. There is no hernia in the left inguinal area or right inguinal area.   Musculoskeletal:         General: Normal range of motion.      Cervical back: Normal range of motion.   Lymphadenopathy:      Lower Body: No right inguinal adenopathy. No left inguinal adenopathy.   Neurological:      General: No focal deficit present.      Mental Status: She is alert and oriented to person, place, and time.   Skin:     General: Skin is warm and dry.   Psychiatric:         Mood and  Affect: Mood normal.         Behavior: Behavior normal.         Thought Content: Thought content normal.         Judgment: Judgment normal.   Vitals and nursing note reviewed.       Specimens: biopsy from 9 o'clock and ECC    Complications: none.  Patient tolerated procedure well.    Impression:  Mild dysplasia    Assessment/Plan    Diagnoses and all orders for this visit:    1. Low grade squamous intraepith lesion on cytologic smear cervix (lgsil) (Primary)  -     Reference Histopathology  -     Reference Histopathology    2. HPV in female  -     Reference Histopathology  -     Reference Histopathology    3. Encounter for pregnancy test, result unknown  -     POC Pregnancy, Urine    4. Amenorrhea      Specimens labelled and sent to Pathology.  Will base further treatment on Pathology findings.  Patient will be called with results.  Patient was instructed if she is not called in 10 days to call the office to discuss results and follow up.    Amenorrhea, s/p    Negative UPT today in office  Advised to call if no menses by May, would consider provera withdrawal bleeding  Recent normal TSH reviewed with pt  Discussed weight fluctuations can contribute to abnormal menses. Could also be from recent bilateral salpingectomy    Return if symptoms worsen or fail to improve.    I spent 30 minutes caring for Jose on this date of service. This time includes time spent by me in the following activities: preparing for the visit, reviewing tests, performing a medically appropriate examination and/or evaluation, counseling and educating the patient/family/caregiver, referring and communicating with other health care professionals, documenting information in the medical record, independently interpreting results and communicating that information with the patient/family/caregiver, care coordination, ordering medications, ordering test(s), ordering procedure(s), obtaining a separately obtained history, and reviewing a separately  obtained history    Gertrudis Devlin, APRN  4/9/2025  11:41 EDT

## 2025-04-14 ENCOUNTER — TELEPHONE (OUTPATIENT)
Dept: OBSTETRICS AND GYNECOLOGY | Facility: CLINIC | Age: 25
End: 2025-04-14
Payer: COMMERCIAL

## 2025-04-14 LAB
DX ICD CODE: NORMAL
DX ICD CODE: NORMAL
PATH REPORT.FINAL DX SPEC: NORMAL
PATH REPORT.GROSS SPEC: NORMAL
PATH REPORT.SITE OF ORIGIN SPEC: NORMAL
PATHOLOGIST NAME: NORMAL
PAYMENT PROCEDURE: NORMAL

## 2025-04-14 NOTE — TELEPHONE ENCOUNTER
Caller: Jose Khan    Relationship: Self    Best call back number: [phone]  - SON'S PHONE PLEASE CALL THIS NUMBER PT'S PHONE IS BROKE.    Caller requesting test results: BX TEST RESULTS    What test was performed: BX     When was the test performed: 04/09/25    Where was the test performed: IN OFFICE - [facility]    Additional notes: PT CALLED STATED SHE IS VERY NERVOUS ABOUT THESE TEST RESULTS AND WANTED TO KNOW IF THEY WERE BACK YET; PT DID NOT GO TO WORK TODAY AND WOULD LIKE A WORK EXCUSE PRINTED OUT SO SHE CAN COME PICK IT UP; PLEASE CALL THE NUMBER LISTED ABOVE AND ADVISE PT.

## 2025-04-14 NOTE — TELEPHONE ENCOUNTER
Ifeoma, results are not in yet. I will call her when I receive. Okay to provide work excuse for today only. She should return to work tomorrow. Or f/u with PCP if she continues to have issues with anxiety. -Gertrudis

## 2025-04-15 ENCOUNTER — TELEPHONE (OUTPATIENT)
Dept: OBSTETRICS AND GYNECOLOGY | Facility: CLINIC | Age: 25
End: 2025-04-15
Payer: COMMERCIAL

## 2025-04-15 NOTE — TELEPHONE ENCOUNTER
I called Jose Khan to review colposcopy results. No answer. VM is not set up, I was unable to leave a .    Gertrudis Devlin, APRN  4/15/25  10:55am

## 2025-04-16 ENCOUNTER — TELEPHONE (OUTPATIENT)
Dept: OBSTETRICS AND GYNECOLOGY | Facility: CLINIC | Age: 25
End: 2025-04-16
Payer: COMMERCIAL

## 2025-04-16 NOTE — TELEPHONE ENCOUNTER
I called Jose Khan to review colposcopy results. No answer. VM is not set up, I was unable to leave a VM. This is my second attempt to reach the pt. I have also sent a [employer] message.      Gertrudis Devlin, TOMAS  4/16/25  12:24pm

## 2025-04-22 ENCOUNTER — TELEPHONE (OUTPATIENT)
Dept: OBSTETRICS AND GYNECOLOGY | Facility: CLINIC | Age: 25
End: 2025-04-22
Payer: COMMERCIAL

## 2025-04-22 NOTE — TELEPHONE ENCOUNTER
Caller: Jose Khan    Relationship: Self    Best call back number: CALL WORK NUMBER  [phone]. CELL PHONE IN NOT WORKING.    What is the best time to reach you: CALL ANYTIME BEFORE 5:00 PM. DOES NOT HAVE ACCESS TO [employer].     Who are you requesting to speak with (clinical staff, provider,  specific staff member): FIRST AVAILABLE CLINICAL STAFF    PATIENT CALLED REQUESTING TO SPEAK WITH CLINICAL. PATIENT AND PARTNER HAVE BOTH TESTED POSITIVE FOR HPV. PATIENT WANTS TO KNOW IF IT IS SAFE TO HAVE UNPROTECTED INTERCOURSE?

## 2025-04-22 NOTE — TELEPHONE ENCOUNTER
There are hundreds of different strains of HPV. Likely her current partner has already had exposure to the strain she has, but I am not able to confirm this. We recommend condoms to prevent the spread of HPV, but if her partner is positive, it is likely the strain she has, unless this is a new partner. If this is a new partner I recommend condoms with intercourse.

## 2025-04-22 NOTE — TELEPHONE ENCOUNTER
3/12/25 HPV +, 4/9/25 Colpo w LGSIL. Her cell phone is broken and needs call at work number prior to 5 pm. She and her partner have both tested positive for HPV and wanting to know if ok to have unprotected intercourse. Thank you

## 2025-04-22 NOTE — TELEPHONE ENCOUNTER
Read your message to Jose. I told her about gardasil and she is interested in that. Can she come to the office to do that or will it help her? Thank you

## 2025-04-22 NOTE — TELEPHONE ENCOUNTER
Yes, I have also discussed gardasil with her in the past. The vaccine will not clear the strain she has, but will protect her from other strains in the future. She will receive 3 vaccines in a six month period of time. Please help with scheduling for her first vaccine. Thank you!

## 2025-04-23 NOTE — TELEPHONE ENCOUNTER
TOMAS Zhang said she has discussed gardasil with you in the past. The vaccine will not clear the strain you have, but will protect you from other strains in the future. You will receive 3 vaccines in a six month period of time. I would be happy to schedule you. Need to talk on a personal phone or schedule via this. I cannot hold at your work phone.

## 2025-05-12 ENCOUNTER — TELEPHONE (OUTPATIENT)
Dept: OBSTETRICS AND GYNECOLOGY | Facility: CLINIC | Age: 25
End: 2025-05-12
Payer: COMMERCIAL

## 2025-05-12 NOTE — TELEPHONE ENCOUNTER
4/9/25 Gabriel. Please read message about left lower abdomen bruise. Do you want her seen or her PCP. Her phone is not working at present, just her work number. Please advise. The last time I called her at work I had to wait a bit. Thank you

## 2025-05-12 NOTE — TELEPHONE ENCOUNTER
Provider: CUCO BARRAZA    Caller: Jose Khan    Relationship to Patient: Self    Pharmacy: [facility] [place]    Phone Number:  [phone]    Reason for Call: KNOT ON ABDOMEN    When was the patient last seen: 04/09/2025    When did it start: 05/05/2025    Where is it located: LOWER LEFT ABDOMEN    Characteristics of symptom/severity: PATIENT STATES ABOUT A WEEK AGO SHE NOTICED A KNOT POP UP ON HER LEFT LOWER ABDOMEN WHICH IMMEDIATELY BRUISED AND THE BRUISE HAS PROGRESSIVELY GOTTEN WORSE.  PATIENT CANNOT THINK OF ANY TRAUMA THAT HAS OCCURRED TO THE AREA.  PATIENT STATES THE KNOT IS ABOUT 2 FINGER SPACES FROM HER HIP BONE AND IS ABOUT 2 FINGER WIDTH AND LENGTH IN CIRCUMFERENCE.  PATIENT IS INQUIRING IF THIS IS SOMETHING SHE CAN SCHEDULE AN APPOINTMENT WITH OUR OFFICE TO HAVE IT EVALUATED OR HER PCP.  PATIENT'S PHONE IS NOT WORKING AT THE PRESENT TIME SO SHE LEFT HER CALL BACK NUMBER AS HER WORK NUMBER JUST ASK FOR HER BY NAME.

## 2025-07-23 ENCOUNTER — TELEPHONE (OUTPATIENT)
Dept: OBSTETRICS AND GYNECOLOGY | Facility: CLINIC | Age: 25
End: 2025-07-23
Payer: COMMERCIAL

## 2025-08-15 ENCOUNTER — TELEPHONE (OUTPATIENT)
Dept: OBSTETRICS AND GYNECOLOGY | Facility: CLINIC | Age: 25
End: 2025-08-15
Payer: COMMERCIAL